# Patient Record
(demographics unavailable — no encounter records)

---

## 2025-01-29 NOTE — PLAN
[FreeTextEntry1] : For vaginal odor/increased urinary frequency: vaginitis panel and urine culture ordered. RTO for results in one week. gentle vulvar hygiene measures/safe sex practices reviewed, increase water intake.  Depo Provera contraception: CG negative today. Reviewed R/B/A of Depo-Provera contraceptive, pt verbalized understanding and wants to resume Depo. Administered IM in R gluteus: medroxyprogesterone acetate, lot # [id number], expiration Aug 2026, NDC 43411-652-15. Advised back-up contraception for one week.  RTO for results, as needed, and in three months for next depo injection.

## 2025-01-29 NOTE — PHYSICAL EXAM
[Appropriately responsive] : appropriately responsive [Alert] : alert [No Acute Distress] : no acute distress [FreeTextEntry3] : pt declined comprehensive GYN exam including pap/Pelvic exam, did self-collected vaginal swab testing

## 2025-01-29 NOTE — HISTORY OF PRESENT ILLNESS
[N] : Patient denies prior pregnancies [Patient reported PAP Smear was normal] : Patient reported PAP Smear was normal [HIV test declined] : HIV test declined [Syphilis test declined] : Syphilis test declined [Gonorrhea test offered] : Gonorrhea test offered [Chlamydia test offered] : Chlamydia test offered [Trichomonas test offered] : Trichomonas test offered [Hepatitis B test declined] : Hepatitis B test declined [Hepatitis C test declined] : Hepatitis C test declined [Patient would like to be screened for STIs] : Patient would like to be screened for STIs [FreeTextEntry1] : PATIENT PRESENT FOR DEPO CONSULT AND POSSIBLE UTI (states that she has some increased urinary frequency and vaginal odor). Denies abnormal bleeding, abnormal discharge, pelvic pain, dysuria. LMP 1/18/25; regular monthly periods.  Last Depo was 2/24, pt has not been sexually active for many months and therefore stopped taking depo; now wants to resume sexual activity so wants to re-start depo.  [Mammogramdate] : 0 [BreastSonogramDate] : 0 [PapSmeardate] : 2/20/23 [BoneDensityDate] : 0 [ColonoscopyDate] : 0 [LMPDate] : 1/18/25 [FreeTextEntry4] : requests only vaginal swab STI testing

## 2025-01-29 NOTE — REVIEW OF SYSTEMS
[Frequency] : frequency [Negative] : Psychiatric [Urgency] : no urgency [Incontinence] : no incontinence [Dysuria] : no dysuria [Abn Vaginal bleeding] : no abnormal vaginal bleeding [Urethral Discharge] : no urethral discharge [Pelvic pain] : no pelvic pain [Genital Rash/Irritation] : no genital rash/irritation

## 2025-04-17 NOTE — HISTORY OF PRESENT ILLNESS
[LMP unknown] : LMP unknown [No] : never pregnant [unknown] : Patient is unsure of the date of her LMP [FreeTextEntry1] : PATIENT PRESENT FOR DEPO SHOT LOT #: [id number] EXP DATE: 06/2026 NDC: [id number] [TextBox_4] : PATIENT PRESENT FOR DEPO INJECTION. [Mammogramdate] : 0 [BreastSonogramDate] : 0 [PapSmeardate] : 2/17/23 [BoneDensityDate] : 0 [ColonoscopyDate] : 0

## 2025-07-02 NOTE — PHYSICAL EXAM
[Chaperoned Physical Exam] : A chaperone was present in the examining room during all aspects of the physical examination. 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 [Appropriately responsive] : appropriately responsive [Alert] : alert [No Acute Distress] : no acute distress [No Lymphadenopathy] : no lymphadenopathy [Regular Rate Rhythm] : regular rate rhythm [No Murmurs] : no murmurs [Clear to Auscultation B/L] : clear to auscultation bilaterally [Soft] : soft [Non-tender] : non-tender [Non-distended] : non-distended [No HSM] : No HSM [No Lesions] : no lesions [No Mass] : no mass [Oriented x3] : oriented x3 [Examination Of The Breasts] : a normal appearance [No Masses] : no breast masses were palpable [Labia Majora] : normal [Labia Minora] : normal [Normal] : normal [Uterine Adnexae] : normal

## 2025-07-02 NOTE — HISTORY OF PRESENT ILLNESS
[TextBox_4] : PATIENT PRESENT FOR DEPO INJECTION. [Mammogramdate] : -0- [BreastSonogramDate] : -0- [PapSmeardate] : 02/17/23 [BoneDensityDate] : -0- [ColonoscopyDate] : -0- [LMPDate] : 6/25/25 [TextBox_6] : 6/25/25 [FreeTextEntry1] : 6/25/25